# Patient Record
(demographics unavailable — no encounter records)

---

## 2025-02-04 NOTE — HISTORY OF PRESENT ILLNESS
[FreeTextEntry1] : Colonoscopy Dr Tang GYN-  Dr Castro,   Mammo- 2/2018 [facility]- Dr Franklin Kinney Rheum- Dr Bacon

## 2025-02-04 NOTE — REASON FOR VISIT
[Home] : at home, [unfilled] , at the time of the visit. [Medical Office: ([facility])___] : at the medical office located in  [Patient] : the patient [Follow-Up - Clinic] : a clinic follow-up of [Hyperlipidemia] : hyperlipidemia [Medication Management] : Medication management [FreeTextEntry1] : T2D

## 2025-02-04 NOTE — HISTORY OF PRESENT ILLNESS
[FreeTextEntry1] : Colonoscopy Dr Tang GYN-  Dr Castro,   Mammo- 2/2018 [facility]- Dr Franklin Kinney Rheum- Dr Bacon None or intercostal

## 2025-02-04 NOTE — PHYSICAL EXAM
[General Appearance - Well Developed] : well developed [Normal Appearance] : normal appearance [Well Groomed] : well groomed [General Appearance - Well Nourished] : well nourished [No Deformities] : no deformities [General Appearance - In No Acute Distress] : no acute distress [Normal Conjunctiva] : the conjunctiva exhibited no abnormalities [Eyelids - No Xanthelasma] : the eyelids demonstrated no xanthelasmas [Respiration, Rhythm And Depth] : normal respiratory rhythm and effort [Exaggerated Use Of Accessory Muscles For Inspiration] : no accessory muscle use [Auscultation Breath Sounds / Voice Sounds] : lungs were clear to auscultation bilaterally [Heart Rate And Rhythm] : heart rate and rhythm were normal [Heart Sounds] : normal S1 and S2 [Murmurs] : no murmurs present [Abdomen Soft] : soft [Abdomen Tenderness] : non-tender [Abdomen Mass (___ Cm)] : no abdominal mass palpated [Abnormal Walk] : normal gait [Gait - Sufficient For Exercise Testing] : the gait was sufficient for exercise testing [Nail Clubbing] : no clubbing of the fingernails [Cyanosis, Localized] : no localized cyanosis [Petechial Hemorrhages (___cm)] : no petechial hemorrhages [Skin Color & Pigmentation] : normal skin color and pigmentation [] : no rash [No Venous Stasis] : no venous stasis [No Skin Ulcers] : no skin ulcer [No Xanthoma] : no  xanthoma was observed [Oriented To Time, Place, And Person] : oriented to person, place, and time [Affect] : the affect was normal [Mood] : the mood was normal [No Anxiety] : not feeling anxious [FreeTextEntry1] : 3 inch scar on lft cheek where a skin CA was removed

## 2025-02-04 NOTE — REASON FOR VISIT
[Home] : at home, [unfilled] , at the time of the visit. [Medical Office: ([facility])___] : at the medical office located in  [Patient] : the patient [Follow-Up - Clinic] : a clinic follow-up of [Hyperlipidemia] : hyperlipidemia [Medication Management] : Medication management [FreeTextEntry1] : T2D

## 2025-02-04 NOTE — ASSESSMENT
[FreeTextEntry1] : CP- Pt has CRFs of DM, HLD, agrees to a CCS, score 59, CCTA (-) in 2019. No CP.  On statin. Scan repeated in 2020, CCS was 76 but marked elevation of the left kori-diaphragm was reported.  Normal Nuclear stress Oct 2020 and Normal Echo 2/2019. NL CCTA 1/2024  STINSON- Pt called to report STINSON late Dec 2023. Prior cardiac w/u has included the CCTA above from 2019. Normal Nuclear stress Oct 2020 and Normal Echo 2/2019.   Marked elevation of the left kori-diaphragm was reported on CTs from 2019 and 2020. Will refer to Dr Akbar Jimenez. Seen by Dr MIRZA. no obvious cause.  I do not have the CT results. Possible deconditioning.  Pt doing rehab/PT.  I rec she lose weight.  HLD- continue statin  Thyroid Dz- on Synthroid  T2D- Will screen carotids, labs drawn 7.6, may need another agent. 10.1 Oct 2020. 7.2 in early 2021. 7.0 early 2022. Clean carotids in 2010. A1c persistently above 7.0, discussed with pt.  A1c 8.2 Oct 2022, 7.5 June 2023.   Labs were drawn today in Office. Only on Metformin 500 BID.  Elevated LFTs- Hep A,B and C studies were (-), US ordered. + gallstone and fatty liver  Insomnia- Lunesta, renewed [id number], iSTOP 8/31/21, 11/22/21, 9/1/22, 3/17/23, 8/29/23, 6/6/24, 2/4/25  Anxiety(and Depression)- Diazepam 5mg given. 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 iSTOP [id number], 9/1/22 Admitted to [facility] May 2019 with rectal bleeding- dx with colitis by CT, uterus and bladder thickened and require f/u. I spoke to the hospitalist on 5/22/19. Sees GYN on a regular basis. Diazepam 5mg given 8/29/23, 6/6/24, 2/4/25.  Health Maintenance- Pt wants an Internist, given Nicky Hull,

## 2025-02-04 NOTE — ASSESSMENT
[FreeTextEntry1] : CP- Pt has CRFs of DM, HLD, agrees to a CCS, score 59, CCTA (-) in 2019. No CP.  On statin. Scan repeated in 2020, CCS was 76 but marked elevation of the left kori-diaphragm was reported.  Normal Nuclear stress Oct 2020 and Normal Echo 2/2019. NL CCTA 1/2024  STINSON- Pt called to report STINSON late Dec 2023. Prior cardiac w/u has included the CCTA above from 2019. Normal Nuclear stress Oct 2020 and Normal Echo 2/2019.   Marked elevation of the left kori-diaphragm was reported on CTs from 2019 and 2020. Will refer to Dr Akbar Jimenez. Seen by Dr MIRZA. no obvious cause.  I do not have the CT results. Possible deconditioning.  Pt doing rehab/PT.  I rec she lose weight.  HLD- continue statin  Thyroid Dz- on Synthroid  T2D- Will screen carotids, labs drawn 7.6, may need another agent. 10.1 Oct 2020. 7.2 in early 2021. 7.0 early 2022. Clean carotids in 2010. A1c persistently above 7.0, discussed with pt.  A1c 8.2 Oct 2022, 7.5 June 2023.   Labs were drawn today in Office. Only on Metformin 500 BID.  Elevated LFTs- Hep A,B and C studies were (-), US ordered. + gallstone and fatty liver  Insomnia- Lunesta, renewed [id number], iSTOP 8/31/21, 11/22/21, 9/1/22, 3/17/23, 8/29/23, 6/6/24, 2/4/25  Anxiety(and Depression)- Diazepam 5mg given. 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 iSTOP [id number], 9/1/22 Admitted to [facility] May 2019 with rectal bleeding- dx with colitis by CT, uterus and bladder thickened and require f/u. I spoke to the hospitalist on 5/22/19. Sees GYN on a regular basis. Diazepam 5mg given 8/29/23, 6/6/24, 2/4/25.  Health Maintenance- Pt wants an Internist, given Nicky Hull,